# Patient Record
Sex: FEMALE | Race: WHITE
[De-identification: names, ages, dates, MRNs, and addresses within clinical notes are randomized per-mention and may not be internally consistent; named-entity substitution may affect disease eponyms.]

---

## 2018-07-25 NOTE — DIS
HOSPITAL COURSE:  Ms. Giang underwent cardiac catheterization today.  She has no obstructive coron
dot artery disease.  There are minimal luminal irregularities.  The LAD was a very small vessel but i
ncreased to normal size with nitroglycerin.  I think the patient may have some degree of microvascula
r angina.  She has no significant epicardial disease.  She also says that probably has some degree of
 hypertensive heart disease.

 

The plan is to do the followin.  Reduce bisoprolol to 5 mg a day from 10 as some of the higher heart rate would probably be benefi
cial with exertion.

2.  Reduce Procardia-XL to 60 mg a day instead of 90 mg.  She is having some edema.

3.  Could consider changing her to Bystolic instead of bisoprolol if her insurance would pay for that
.

4.  May need further diuretic therapy.

5.  Could consider changing to stronger antigens receptor blocker in the form of Benicar 40 mg a day.
  She should be treated medically.  She will come back and see us in a month.

## 2022-09-06 ENCOUNTER — HOSPITAL ENCOUNTER (OUTPATIENT)
Dept: HOSPITAL 92 - LABBT | Age: 67
Discharge: HOME | End: 2022-09-06
Attending: ORTHOPAEDIC SURGERY
Payer: COMMERCIAL

## 2022-09-06 DIAGNOSIS — G56.02: ICD-10-CM

## 2022-09-06 DIAGNOSIS — Z20.822: ICD-10-CM

## 2022-09-06 DIAGNOSIS — Z01.818: Primary | ICD-10-CM

## 2022-09-06 LAB
BASOPHILS # BLD AUTO: 0.1 10X3/UL (ref 0–0.2)
BASOPHILS NFR BLD AUTO: 0.9 % (ref 0–2)
EOSINOPHIL # BLD AUTO: 0.4 10X3/UL (ref 0–0.5)
EOSINOPHIL NFR BLD AUTO: 4.6 % (ref 0–6)
HGB BLD-MCNC: 12.6 G/DL (ref 12–15.5)
LYMPHOCYTES NFR BLD AUTO: 20.5 % (ref 18–47)
MCH RBC QN AUTO: 30.7 PG (ref 27–33)
MCV RBC AUTO: 89.5 FL (ref 81.6–98.3)
MONOCYTES # BLD AUTO: 0.7 10X3/UL (ref 0–1.1)
MONOCYTES NFR BLD AUTO: 9.3 % (ref 0–10)
NEUTROPHILS # BLD AUTO: 4.9 10X3/UL (ref 1.5–8.4)
NEUTROPHILS NFR BLD AUTO: 64.2 % (ref 40–75)
PLATELET # BLD AUTO: 292 10X3/UL (ref 150–450)
RBC # BLD AUTO: 4.1 10X6/UL (ref 3.9–5.03)
WBC # BLD AUTO: 7.6 10X3/UL (ref 3.5–10.5)

## 2022-09-06 PROCEDURE — 93005 ELECTROCARDIOGRAM TRACING: CPT

## 2022-09-06 PROCEDURE — 85025 COMPLETE CBC W/AUTO DIFF WBC: CPT

## 2022-09-06 PROCEDURE — 93010 ELECTROCARDIOGRAM REPORT: CPT

## 2022-09-06 PROCEDURE — 87811 SARS-COV-2 COVID19 W/OPTIC: CPT

## 2022-09-09 ENCOUNTER — HOSPITAL ENCOUNTER (OUTPATIENT)
Dept: HOSPITAL 92 - SDC | Age: 67
Discharge: HOME | End: 2022-09-09
Attending: ORTHOPAEDIC SURGERY
Payer: MEDICARE

## 2022-09-09 VITALS — BODY MASS INDEX: 31.3 KG/M2

## 2022-09-09 DIAGNOSIS — Z79.82: ICD-10-CM

## 2022-09-09 DIAGNOSIS — Z79.899: ICD-10-CM

## 2022-09-09 DIAGNOSIS — I10: ICD-10-CM

## 2022-09-09 DIAGNOSIS — G56.02: Primary | ICD-10-CM

## 2022-09-09 DIAGNOSIS — Z88.0: ICD-10-CM

## 2022-09-09 DIAGNOSIS — Z79.890: ICD-10-CM

## 2022-09-09 PROCEDURE — 01N50ZZ RELEASE MEDIAN NERVE, OPEN APPROACH: ICD-10-PCS | Performed by: ORTHOPAEDIC SURGERY

## 2022-09-09 PROCEDURE — S0020 INJECTION, BUPIVICAINE HYDRO: HCPCS

## 2022-11-07 ENCOUNTER — HOSPITAL ENCOUNTER (OUTPATIENT)
Dept: HOSPITAL 92 - BICRAD | Age: 67
Discharge: HOME | End: 2022-11-07
Attending: PHYSICIAN ASSISTANT
Payer: COMMERCIAL

## 2022-11-07 DIAGNOSIS — R05.9: Primary | ICD-10-CM

## 2022-11-07 PROCEDURE — 71046 X-RAY EXAM CHEST 2 VIEWS: CPT

## 2022-11-08 ENCOUNTER — HOSPITAL ENCOUNTER (EMERGENCY)
Dept: HOSPITAL 92 - CSHERS | Age: 67
Discharge: HOME | End: 2022-11-08
Payer: COMMERCIAL

## 2022-11-08 DIAGNOSIS — E11.10: ICD-10-CM

## 2022-11-08 DIAGNOSIS — I10: ICD-10-CM

## 2022-11-08 DIAGNOSIS — E11.65: Primary | ICD-10-CM

## 2022-11-08 DIAGNOSIS — E78.00: ICD-10-CM

## 2022-11-08 LAB
ALBUMIN SERPL BCG-MCNC: 4.5 G/DL (ref 3.4–4.8)
ALP SERPL-CCNC: 147 U/L (ref 40–110)
ALT SERPL W P-5'-P-CCNC: 46 U/L (ref 8–55)
ANALYZER IN CARDIO: (no result)
ANION GAP SERPL CALC-SCNC: 14 MMOL/L (ref 10–20)
AST SERPL-CCNC: 36 U/L (ref 5–34)
BASE EXCESS STD BLDV CALC-SCNC: -0.7 MEQ/L
BASOPHILS # BLD AUTO: 0.1 10X3/UL (ref 0–0.2)
BASOPHILS NFR BLD AUTO: 0.9 % (ref 0–2)
BILIRUB SERPL-MCNC: 0.5 MG/DL (ref 0.2–1.2)
BUN SERPL-MCNC: 10 MG/DL (ref 9.8–20.1)
CA-I BLDV-MCNC: 1.16 MMOL/L (ref 1.16–1.32)
CALCIUM SERPL-MCNC: 9.5 MG/DL (ref 7.8–10.44)
CHLORIDE BLDV-SCNC: 104 MMOL/L (ref 98–106)
CHLORIDE SERPL-SCNC: 105 MMOL/L (ref 98–107)
CO2 SERPL-SCNC: 25 MMOL/L (ref 23–31)
CREAT CL PREDICTED SERPL C-G-VRATE: 0 ML/MIN (ref 70–130)
EOSINOPHIL # BLD AUTO: 0.4 10X3/UL (ref 0–0.5)
EOSINOPHIL NFR BLD AUTO: 5.1 % (ref 0–6)
GLOBULIN SER CALC-MCNC: 2.7 G/DL (ref 2.4–3.5)
GLUCOSE SERPL-MCNC: 130 MG/DL (ref 80–115)
HCO3 BLDV-SCNC: 25 MEQ/L (ref 22–28)
HCT VFR BLDV CALC: 39 % (ref 36–47)
HGB BLD-MCNC: 12.3 G/DL (ref 12–15.5)
HGB BLDV-MCNC: 13.4 G/DL (ref 11.7–16.1)
LYMPHOCYTES NFR BLD AUTO: 24 % (ref 18–47)
MAGNESIUM SERPL-MCNC: 2.2 MG/DL (ref 1.6–2.6)
MCH RBC QN AUTO: 31.2 PG (ref 27–33)
MCV RBC AUTO: 87.6 FL (ref 81.6–98.3)
MONOCYTES # BLD AUTO: 1.2 10X3/UL (ref 0–1.1)
MONOCYTES NFR BLD AUTO: 14.7 % (ref 0–10)
NEUTROPHILS # BLD AUTO: 4.5 10X3/UL (ref 1.5–8.4)
NEUTROPHILS NFR BLD AUTO: 54.7 % (ref 40–75)
PLATELET # BLD AUTO: 337 10X3/UL (ref 150–450)
POTASSIUM BLDV-SCNC: 3.87 MMOL/L (ref 3.7–5.3)
POTASSIUM SERPL-SCNC: 4.1 MMOL/L (ref 3.5–5.1)
RAPIDCOMM COLLECT BY: (no result)
RBC # BLD AUTO: 3.94 10X6/UL (ref 3.9–5.03)
SODIUM BLDV-SCNC: 136.8 MMOL/L (ref 133–146)
SODIUM SERPL-SCNC: 140 MMOL/L (ref 136–145)
SPECIMEN DRAWN FROM PATIENT: (no result)
WBC # BLD AUTO: 8.2 10X3/UL (ref 3.5–10.5)

## 2022-11-08 PROCEDURE — 36415 COLL VENOUS BLD VENIPUNCTURE: CPT

## 2022-11-08 PROCEDURE — 82805 BLOOD GASES W/O2 SATURATION: CPT

## 2022-11-08 PROCEDURE — 84100 ASSAY OF PHOSPHORUS: CPT

## 2022-11-08 PROCEDURE — 99284 EMERGENCY DEPT VISIT MOD MDM: CPT

## 2022-11-08 PROCEDURE — 85025 COMPLETE CBC W/AUTO DIFF WBC: CPT

## 2022-11-08 PROCEDURE — 83735 ASSAY OF MAGNESIUM: CPT

## 2022-11-08 PROCEDURE — 80053 COMPREHEN METABOLIC PANEL: CPT

## 2022-11-08 PROCEDURE — 82010 KETONE BODYS QUAN: CPT
